# Patient Record
Sex: MALE | Race: WHITE
[De-identification: names, ages, dates, MRNs, and addresses within clinical notes are randomized per-mention and may not be internally consistent; named-entity substitution may affect disease eponyms.]

---

## 2019-09-13 ENCOUNTER — HOSPITAL ENCOUNTER (OUTPATIENT)
Dept: HOSPITAL 53 - M OPP | Age: 34
Discharge: HOME | End: 2019-09-13
Attending: INTERNAL MEDICINE
Payer: COMMERCIAL

## 2019-09-13 VITALS — HEIGHT: 73 IN | WEIGHT: 262 LBS | BODY MASS INDEX: 34.72 KG/M2

## 2019-09-13 VITALS — DIASTOLIC BLOOD PRESSURE: 82 MMHG | SYSTOLIC BLOOD PRESSURE: 145 MMHG

## 2019-09-13 DIAGNOSIS — K22.8: Primary | ICD-10-CM

## 2019-09-13 DIAGNOSIS — R12: ICD-10-CM

## 2019-09-13 NOTE — ROOR
________________________________________________________________________________

Patient Name: Low Lemon            Procedure Date: 9/13/2019 1:05 PM

MRN: [id number]                          Account Number: [id number]

YOB: 1985               Age: 33

Room: [facility]                            Gender: Male

Note Status: Finalized                 

________________________________________________________________________________

 

Procedure:           Upper GI endoscopy

Indications:         Heartburn

Providers:           Thad RICHARDSON MD

Referring MD:        Lavinia Colon DO

Requesting Provider: 

Medicines:           Monitored Anesthesia Care

Complications:       No immediate complications.

________________________________________________________________________________

Procedure:           Pre-Anesthesia Assessment:

                     - The heart rate, respiratory rate, oxygen saturations, 

                     blood pressure, adequacy of pulmonary ventilation, and 

                     response to care were monitored throughout the procedure.

                     The Endoscope was introduced through the mouth, and 

                     advanced to the second part of duodenum. The upper GI 

                     endoscopy was accomplished without difficulty. The 

                     patient tolerated the procedure well.

                                                                                

Findings:

     The Z-line was variable and was found 41 cm from the incisors. This was 

     biopsied with a cold forceps for histology.

     The esophagus was normal.

     The stomach was normal.

     The examined duodenum was normal.

                                                                                

Impression:          - Normal esophagus. Z-line variable, 41 cm from the 

                     incisors. Biopsied.

                     - Normal stomach.

                     - Normal examined duodenum.

Recommendation:      - Follow an antireflux regimen.

                     - Telephone endoscopist for pathology results in 2 weeks.

                                                                                

 

Thad Richardson MD

________________

Thad RICHARDSON MD

9/13/2019 1:24:48 PM

Electronically signed by Thad RICHARDSON MD

Number of Addenda: 0

 

Note Initiated On: 9/13/2019 1:05 PM

Estimated Blood Loss:

     Estimated blood loss: none.

## 2021-02-26 ENCOUNTER — HOSPITAL ENCOUNTER (OUTPATIENT)
Dept: HOSPITAL 53 - M WUC | Age: 36
End: 2021-02-26
Attending: INTERNAL MEDICINE
Payer: COMMERCIAL

## 2021-02-26 DIAGNOSIS — M25.511: Primary | ICD-10-CM

## 2021-02-26 NOTE — REP
INDICATION:

PAIN.



COMPARISON:

None.



TECHNIQUE:

Three views right shoulder.



FINDINGS:

There is no evidence of acute fracture, dislocation or intrinsic bone disease.

Appears to be a benign bone island in the humeral head.



IMPRESSION:

Unremarkable right shoulder series.





<Electronically signed by Prabhjot Frank > 02/26/21 0987

## 2021-04-28 ENCOUNTER — HOSPITAL ENCOUNTER (OUTPATIENT)
Dept: HOSPITAL 53 - M RADPRO | Age: 36
End: 2021-04-28
Attending: PHYSICIAN ASSISTANT
Payer: COMMERCIAL

## 2021-04-28 DIAGNOSIS — M75.91: ICD-10-CM

## 2021-04-28 DIAGNOSIS — M94.211: ICD-10-CM

## 2021-04-28 DIAGNOSIS — M65.811: Primary | ICD-10-CM

## 2021-04-28 DIAGNOSIS — M25.311: ICD-10-CM

## 2021-04-28 PROCEDURE — 77002 NEEDLE LOCALIZATION BY XRAY: CPT

## 2021-04-28 PROCEDURE — 23350 INJECTION FOR SHOULDER X-RAY: CPT

## 2021-04-28 PROCEDURE — 73223 MRI JOINT UPR EXTR W/O&W/DYE: CPT

## 2021-04-28 NOTE — REP
INDICATION:

INSTABILITY RIGHT SHOULDER.



COMPARISON:

None



TECHNIQUE:

The procedure was performed by BHARATI Rod, under the direct

supervision of Dr. Fung.



The benefits and risks of the procedure were explained to the patient, and an informed

consent was obtained.  Directly prior to the start of the procedure, a formal time-out

was completed in the procedure room.



The right glenohumeral joint space was localized using fluoroscopic guidance.  The

skin was prepped and draped in a sterile fashion.  Approximately 5 mL of 1% Lidocaine

10 mg/ml was used as a local anesthetic.  Using fluoroscopic guidance, a #22 gauge

spinal needle was inserted and advanced into the right glenohumeral joint space.

Approximately 1 mL of Isovue 300 was injected to verify placement.  Twelve mL of a

solution containing 20 mL of sterile saline and 0.15 mL of ProHance was injected into

the joint space.  The needle was removed and the patient was taken to MRI for post

procedural imaging.



FINDINGS:

The patient tolerated the procedure well and there were no immediate complications.



IMPRESSION:

Fluoroscopic guided MRI arthrogram injection of the right shoulder.



0.1 minutes of fluoroscopy time was utilized for this procedure. Some fluoroscopic

images are performed with last image hold technology.  These images require no

additional radiation.



<Electronically signed by Francheska Friedman > 04/28/21 1016

<Electronically signed by Rmoel Fung > 04/28/21 1152

## 2021-04-28 NOTE — REP
INDICATION:

INSTABILITY RT SHOULDER. Rule out rotator cuff tear.



COMPARISON:

Comparison right shoulder MR arthrography is from December 26, 2006.  Comparison right

shoulder radiographs are from February 26, 2021..



TECHNIQUE:

The injection procedure is performed and dictated separately.  Pre and post

intra-articular gadolinium enhanced saline injected imaging is acquired.  Imaging

planes include axial, oblique coronal, oblique sagittal and ABER projection images.

T1 and T2-weighted scans are included with and without fat saturation.



FINDINGS:

Pre-injection in imaging again demonstrated old subtle Hill-Sachs impaction deformity

in the superolateral humeral head.  Subcortical cyst formation is also noted

posterolaterally and anteromedially.  There is glenohumeral osteoarthritic spurring

noted inferiorly.  Severe chondromalacia is seen in the anterior aspect of the glenoid

articular cartilage.  There is a large subcortical cyst in the anterior bony glenoid

on today's images.  There is a glenohumeral joint effusion of moderate size.

Heterogeneous signal intensity is seen in the subcoracoid recess fluid.  I cannot

exclude osteo cartilaginous loose body here.  There is a 4 mm cartilaginous loose body

adjacent to the biceps tendon in the biceps tendon sheath.  Biceps tendon

infraspinatus tendon and subscapularis tendons appear intact.  There is again evidence

of a superior and anterior labral tear.  Not displaced.  There is advanced tendinitis

tendinosis change in the supraspinatus tendon on oblique coronal T1 weighted scans.

No focal full-thickness supraspinatus tear is seen.



There is mild osteoarthritic hypertrophy of the acromioclavicular joint.



Post injection imaging today demonstrates good filling and enhancement.  There is

extensive synovial thickening visible diffusely with a pattern of numerous tiny low T1

low T2 signal intensity foci lining the synovium consistent with chronic synovitis.

T1 fat sat postcontrast images demonstrate intrasubstance contrast enhancement in the

supraspinatus tendon consistent with partial-thickness tear.



IMPRESSION:

Evidence of chronic synovitis.  Naschitti-Sachs impaction deformity.  Nondisplaced

anterior cartilaginous labral tear and severe a glenohumeral chondromalacia with early

osteoarthritic spurring.  Nondisplaced superior labral cartilage tear.  Advanced

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  Loose body in the biceps tendon sheath

and questionable loose body in the subcoracoid recess.





<Electronically signed by Romel Fung > 04/28/21 [id number]

## 2022-01-10 ENCOUNTER — HOSPITAL ENCOUNTER (OUTPATIENT)
Dept: HOSPITAL 53 - M RADPRO | Age: 37
End: 2022-01-10
Attending: ORTHOPAEDIC SURGERY
Payer: COMMERCIAL

## 2022-01-10 DIAGNOSIS — M19.111: Primary | ICD-10-CM

## 2022-01-10 PROCEDURE — 20610 DRAIN/INJ JOINT/BURSA W/O US: CPT

## 2022-01-10 PROCEDURE — 77002 NEEDLE LOCALIZATION BY XRAY: CPT

## 2023-02-01 ENCOUNTER — HOSPITAL ENCOUNTER (OUTPATIENT)
Dept: HOSPITAL 53 - M PLAIMG | Age: 38
End: 2023-02-01
Attending: ORTHOPAEDIC SURGERY
Payer: COMMERCIAL

## 2023-02-01 DIAGNOSIS — M19.011: Primary | ICD-10-CM

## 2024-07-25 ENCOUNTER — HOSPITAL ENCOUNTER (OUTPATIENT)
Dept: HOSPITAL 53 - M SFHCPLAZ | Age: 39
End: 2024-07-25
Attending: FAMILY MEDICINE
Payer: COMMERCIAL

## 2024-07-25 DIAGNOSIS — K21.00: ICD-10-CM

## 2024-07-25 DIAGNOSIS — E78.2: ICD-10-CM

## 2024-07-25 DIAGNOSIS — E55.9: Primary | ICD-10-CM

## 2024-07-25 DIAGNOSIS — E66.3: ICD-10-CM

## 2024-07-25 DIAGNOSIS — Z53.9: ICD-10-CM

## 2024-07-25 DIAGNOSIS — R03.0: ICD-10-CM

## 2024-07-25 DIAGNOSIS — M17.0: ICD-10-CM

## 2024-08-14 ENCOUNTER — HOSPITAL ENCOUNTER (OUTPATIENT)
Dept: HOSPITAL 53 - M PLALAB | Age: 39
End: 2024-08-14
Attending: FAMILY MEDICINE
Payer: COMMERCIAL

## 2024-08-14 DIAGNOSIS — E78.2: ICD-10-CM

## 2024-08-14 DIAGNOSIS — E66.3: ICD-10-CM

## 2024-08-14 DIAGNOSIS — R03.0: ICD-10-CM

## 2024-08-14 DIAGNOSIS — E55.9: ICD-10-CM

## 2024-08-14 DIAGNOSIS — K21.00: ICD-10-CM

## 2024-08-14 DIAGNOSIS — M17.0: Primary | ICD-10-CM

## 2024-08-14 LAB
25(OH)D3 SERPL-MCNC: 30.4 NG/ML (ref 20–100)
ALBUMIN SERPL BCG-MCNC: 4.3 G/DL (ref 3.2–5.2)
ALP SERPL-CCNC: 58 U/L (ref 46–116)
ALT SERPL W P-5'-P-CCNC: 11 U/L (ref 7–40)
AST SERPL-CCNC: < 8 U/L (ref ?–34)
BASOPHILS # BLD AUTO: 0.1 10^3/UL (ref 0–0.2)
BASOPHILS NFR BLD AUTO: 1 % (ref 0–1)
BILIRUB SERPL-MCNC: 0.7 MG/DL (ref 0.3–1.2)
BUN SERPL-MCNC: 10 MG/DL (ref 9–23)
CALCIUM SERPL-MCNC: 9.4 MG/DL (ref 8.5–10.1)
CHLORIDE SERPL-SCNC: 107 MMOL/L (ref 98–107)
CHOLEST SERPL-MCNC: 293 MG/DL (ref ?–200)
CHOLEST/HDLC SERPL: 6.78 {RATIO} (ref ?–5)
CO2 SERPL-SCNC: 27 MMOL/L (ref 20–31)
CREAT SERPL-MCNC: 0.97 MG/DL (ref 0.7–1.3)
CRP SERPL-MCNC: < 0.4 MG/DL (ref ?–1)
EOSINOPHIL # BLD AUTO: 0.5 10^3/UL (ref 0–0.5)
EOSINOPHIL NFR BLD AUTO: 5.6 % (ref 0–3)
ERYTHROCYTE [SEDIMENTATION RATE] IN BLOOD BY WESTERGREN METHOD: 12 MM/HR (ref 0–15)
EST. AVERAGE GLUCOSE BLD GHB EST-MCNC: 105 MG/DL (ref 60–110)
FERRITIN SERPL-MCNC: 23.9 NG/ML (ref 10.5–307.3)
GFR SERPL CREATININE-BSD FRML MDRD: > 60 ML/MIN/{1.73_M2} (ref 60–?)
GLUCOSE SERPL-MCNC: 89 MG/DL (ref 60–100)
HCT VFR BLD AUTO: 45.4 % (ref 42–52)
HDLC SERPL-MCNC: 43.2 MG/DL (ref 40–?)
HGB BLD-MCNC: 15.7 G/DL (ref 13.5–17.5)
LDLC SERPL CALC-MCNC: 218 MG/DL (ref ?–100)
LYMPHOCYTES # BLD AUTO: 2.3 10^3/UL (ref 1.5–5)
LYMPHOCYTES NFR BLD AUTO: 27.7 % (ref 24–44)
MCH RBC QN AUTO: 32 PG (ref 27–33)
MCHC RBC AUTO-ENTMCNC: 34.6 G/DL (ref 32–36.5)
MCV RBC AUTO: 92.7 FL (ref 80–96)
MONOCYTES # BLD AUTO: 0.5 10^3/UL (ref 0–0.8)
MONOCYTES NFR BLD AUTO: 6.3 % (ref 2–8)
NEUTROPHILS # BLD AUTO: 5 10^3/UL (ref 1.5–8.5)
NEUTROPHILS NFR BLD AUTO: 59.3 % (ref 36–66)
NONHDLC SERPL-MCNC: 249.8 MG/DL
PLATELET # BLD AUTO: 342 10^3/UL (ref 150–450)
POTASSIUM SERPL-SCNC: 4.4 MMOL/L (ref 3.5–5.1)
PROT SERPL-MCNC: 7.6 G/DL (ref 5.7–8.2)
PTH-INTACT SERPL-MCNC: 36.9 PG/ML (ref 18.5–88)
RBC # BLD AUTO: 4.9 10^6/UL (ref 4.3–6.1)
SODIUM SERPL-SCNC: 136 MMOL/L (ref 136–145)
T4 FREE SERPL-MCNC: 0.97 NG/DL (ref 0.89–1.76)
TRIGL SERPL-MCNC: 159 MG/DL (ref ?–150)
TSH SERPL DL<=0.005 MIU/L-ACNC: 1.59 UIU/ML (ref 0.55–4.78)
VIT B12 SERPL-MCNC: 287 PG/ML (ref 211–911)
WBC # BLD AUTO: 8.4 10^3/UL (ref 4–10)

## 2024-08-15 LAB — INSULIN SERPL-ACNC: 19.7 UIU/ML (ref ?–18.4)

## 2024-08-16 LAB
ANA SER QL IF: POSITIVE
ANA TITR SER IF: (no result) TITER
CCP IGG SERPL-ACNC: < 16 UNITS (ref ?–20)

## 2024-09-17 ENCOUNTER — HOSPITAL ENCOUNTER (OUTPATIENT)
Dept: HOSPITAL 53 - M PLAIMG | Age: 39
End: 2024-09-17
Attending: INTERNAL MEDICINE

## 2024-09-17 DIAGNOSIS — Z00.00: Primary | ICD-10-CM

## 2025-03-17 ENCOUNTER — HOSPITAL ENCOUNTER (OUTPATIENT)
Dept: HOSPITAL 53 - M PLALAB | Age: 40
End: 2025-03-17
Attending: FAMILY MEDICINE
Payer: COMMERCIAL

## 2025-03-17 DIAGNOSIS — E78.2: ICD-10-CM

## 2025-03-17 DIAGNOSIS — E53.8: ICD-10-CM

## 2025-03-17 DIAGNOSIS — G89.4: ICD-10-CM

## 2025-03-17 DIAGNOSIS — D50.9: Primary | ICD-10-CM

## 2025-03-17 LAB
BASOPHILS # BLD AUTO: 0.1 10^3/UL (ref 0–0.2)
BASOPHILS NFR BLD AUTO: 0.6 % (ref 0–1)
CK SERPL-CCNC: 129 U/L (ref 46–171)
EOSINOPHIL # BLD AUTO: 0.2 10^3/UL (ref 0–0.5)
EOSINOPHIL NFR BLD AUTO: 2.6 % (ref 0–3)
FERRITIN SERPL-MCNC: 23.4 NG/ML (ref 10.5–307.3)
HCT VFR BLD AUTO: 45.3 % (ref 42–52)
HCT VFR BLD AUTO: 45.6 % (ref 42–52)
HGB BLD-MCNC: 15.3 G/DL (ref 13.5–17.5)
LYMPHOCYTES # BLD AUTO: 2.3 10^3/UL (ref 1.5–5)
LYMPHOCYTES NFR BLD AUTO: 29.2 % (ref 24–44)
MCH RBC QN AUTO: 32.3 PG (ref 27–33)
MCHC RBC AUTO-ENTMCNC: 33.6 G/DL (ref 32–36.5)
MCV RBC AUTO: 96.4 FL (ref 80–96)
MONOCYTES # BLD AUTO: 0.4 10^3/UL (ref 0–0.8)
MONOCYTES NFR BLD AUTO: 5 % (ref 2–8)
NEUTROPHILS # BLD AUTO: 5 10^3/UL (ref 1.5–8.5)
NEUTROPHILS NFR BLD AUTO: 62.2 % (ref 36–66)
PLATELET # BLD AUTO: 294 10^3/UL (ref 150–450)
RBC # BLD AUTO: 4.73 10^6/UL (ref 4.3–6.1)
RHEUMATOID FACT SERPL-ACNC: < 3.5 IU/ML (ref ?–14)
VIT B12 SERPL-MCNC: 600 PG/ML (ref 211–911)
WBC # BLD AUTO: 8 10^3/UL (ref 4–10)

## 2025-03-19 LAB — ANA SER QL IF: NEGATIVE
